# Patient Record
Sex: FEMALE | Race: BLACK OR AFRICAN AMERICAN | NOT HISPANIC OR LATINO | URBAN - METROPOLITAN AREA
[De-identification: names, ages, dates, MRNs, and addresses within clinical notes are randomized per-mention and may not be internally consistent; named-entity substitution may affect disease eponyms.]

---

## 2021-05-20 ENCOUNTER — OFFICE VISIT (OUTPATIENT)
Dept: URBAN - METROPOLITAN AREA CLINIC 118 | Facility: CLINIC | Age: 26
End: 2021-05-20

## 2021-05-20 ENCOUNTER — LAB OUTSIDE AN ENCOUNTER (OUTPATIENT)
Dept: URBAN - METROPOLITAN AREA CLINIC 118 | Facility: CLINIC | Age: 26
End: 2021-05-20

## 2021-05-20 DIAGNOSIS — K21.9 GERD WITHOUT ESOPHAGITIS: ICD-10-CM

## 2021-05-20 DIAGNOSIS — E66.01 MORBID OBESITY: ICD-10-CM

## 2021-05-20 DIAGNOSIS — R12 HEARTBURN: ICD-10-CM

## 2021-05-20 PROBLEM — 238136002: Status: ACTIVE | Noted: 2021-05-20

## 2021-05-20 PROBLEM — 266435005: Status: ACTIVE | Noted: 2021-05-20

## 2021-05-20 PROCEDURE — 99204 OFFICE O/P NEW MOD 45 MIN: CPT | Performed by: INTERNAL MEDICINE

## 2021-05-20 RX ORDER — PANTOPRAZOLE SODIUM 40 MG/1
1 TABLET TABLET, DELAYED RELEASE ORAL ONCE A DAY
Qty: 30 TABLET | Refills: 1 | OUTPATIENT
Start: 2021-05-20

## 2021-05-20 NOTE — HPI-TODAY'S VISIT:
This is a 26 yo female with pmh of asthma, CHRISTINE on CPAP, and obesity here for evaluation prior to her bariatric surgery. She is undergoing work up with [facility] bariatric clinic for planned gastric bypass. EGD was planned but not covered with the practice. She reports on and off heartburn symptoms with burning in her chest after eating and regurgitation, worse after eating at night. She states she had digestion problems as a child and needed a tube down her throat for feeding for some time but does not know of any diagnosis or other details.  Denies any dysphagia, nausea/vomiting.

## 2021-06-03 ENCOUNTER — OFFICE VISIT (OUTPATIENT)
Dept: URBAN - METROPOLITAN AREA MEDICAL CENTER 16 | Facility: MEDICAL CENTER | Age: 26
End: 2021-06-03

## 2021-06-03 DIAGNOSIS — K21.00 ALKALINE REFLUX ESOPHAGITIS: ICD-10-CM

## 2021-06-03 DIAGNOSIS — K31.89 ACQUIRED DEFORMITY OF DUODENUM: ICD-10-CM

## 2021-06-03 PROCEDURE — 43239 EGD BIOPSY SINGLE/MULTIPLE: CPT | Performed by: INTERNAL MEDICINE

## 2021-09-14 ENCOUNTER — TELEPHONE ENCOUNTER (OUTPATIENT)
Dept: URBAN - METROPOLITAN AREA CLINIC 23 | Facility: CLINIC | Age: 26
End: 2021-09-14

## 2021-10-20 ENCOUNTER — TELEPHONE ENCOUNTER (OUTPATIENT)
Dept: URBAN - METROPOLITAN AREA CLINIC 23 | Facility: CLINIC | Age: 26
End: 2021-10-20

## 2021-12-04 ENCOUNTER — OUT OF OFFICE VISIT (OUTPATIENT)
Dept: URBAN - METROPOLITAN AREA MEDICAL CENTER 10 | Facility: MEDICAL CENTER | Age: 26
End: 2021-12-04

## 2021-12-04 DIAGNOSIS — K31.89 ACQUIRED DEFORMITY OF DUODENUM: ICD-10-CM

## 2021-12-04 DIAGNOSIS — Z98.84 BARIATRIC SURG STAT-UNSP: ICD-10-CM

## 2021-12-04 DIAGNOSIS — K22.89 ESOPHAGEAL BLEEDING: ICD-10-CM

## 2021-12-04 DIAGNOSIS — R10.13 ABDOMINAL DISCOMFORT, EPIGASTRIC: ICD-10-CM

## 2021-12-04 DIAGNOSIS — R11.2 ACUTE NAUSEA WITH NONBILIOUS VOMITING: ICD-10-CM

## 2021-12-04 PROCEDURE — 43239 EGD BIOPSY SINGLE/MULTIPLE: CPT | Performed by: INTERNAL MEDICINE

## 2021-12-04 PROCEDURE — G8427 DOCREV CUR MEDS BY ELIG CLIN: HCPCS | Performed by: INTERNAL MEDICINE

## 2021-12-04 PROCEDURE — 99221 1ST HOSP IP/OBS SF/LOW 40: CPT | Performed by: INTERNAL MEDICINE

## 2023-11-01 ENCOUNTER — OFFICE VISIT (OUTPATIENT)
Dept: URBAN - METROPOLITAN AREA TELEHEALTH 2 | Facility: TELEHEALTH | Age: 28
End: 2023-11-01

## 2023-11-22 ENCOUNTER — DASHBOARD ENCOUNTERS (OUTPATIENT)
Age: 28
End: 2023-11-22

## 2023-11-27 ENCOUNTER — CLAIMS CREATED FROM THE CLAIM WINDOW (OUTPATIENT)
Dept: URBAN - METROPOLITAN AREA TELEHEALTH 2 | Facility: TELEHEALTH | Age: 28
End: 2023-11-27

## 2023-11-27 ENCOUNTER — OFFICE VISIT (OUTPATIENT)
Dept: URBAN - METROPOLITAN AREA TELEHEALTH 2 | Facility: TELEHEALTH | Age: 28
End: 2023-11-27

## 2023-11-27 VITALS — WEIGHT: 219 LBS | BODY MASS INDEX: 38.8 KG/M2 | HEIGHT: 63 IN

## 2023-11-27 DIAGNOSIS — R10.13 EPIGASTRIC ABDOMINAL PAIN: ICD-10-CM

## 2023-11-27 DIAGNOSIS — E66.01 MORBID OBESITY: ICD-10-CM

## 2023-11-27 DIAGNOSIS — R12 HEARTBURN: ICD-10-CM

## 2023-11-27 DIAGNOSIS — K21.9 GERD WITHOUT ESOPHAGITIS: ICD-10-CM

## 2023-11-27 DIAGNOSIS — K29.90 GASTRITIS AND DUODENITIS: ICD-10-CM

## 2023-11-27 DIAGNOSIS — Z98.84 HISTORY OF BARIATRIC SURGERY: ICD-10-CM

## 2023-11-27 PROCEDURE — 99442 PHONE E/M BY PHYS 11-20 MIN: CPT | Performed by: PHYSICIAN ASSISTANT

## 2023-11-27 RX ORDER — PANTOPRAZOLE SODIUM 40 MG/1
1 TABLET TABLET, DELAYED RELEASE ORAL TWICE A DAY
Qty: 60 TABLET | Refills: 1 | OUTPATIENT

## 2023-11-27 RX ORDER — MULTIVIT-MIN/IRON/FOLIC ACID/K 45-800-120
AS DIRECTED CAPSULE ORAL
Status: ACTIVE | COMMUNITY

## 2023-11-27 RX ORDER — OMEPRAZOLE 40 MG/1
1 CAPSULE 30 MINUTES BEFORE MORNING MEAL CAPSULE, DELAYED RELEASE ORAL ONCE A DAY
Status: ACTIVE | COMMUNITY

## 2023-11-27 RX ORDER — SUCRALFATE 1 G/1
1 TABLET ON AN EMPTY STOMACH TABLET ORAL TWICE A DAY
Qty: 60 TABLET | Refills: 1 | OUTPATIENT
Start: 2023-11-27 | End: 2024-01-26

## 2023-11-27 RX ORDER — PANTOPRAZOLE SODIUM 40 MG/1
1 TABLET TABLET, DELAYED RELEASE ORAL ONCE A DAY
Qty: 30 TABLET | Refills: 1 | Status: DISCONTINUED | COMMUNITY
Start: 2021-05-20

## 2023-11-27 NOTE — HPI-TODAY'S VISIT:
Ms. Pablo is a 27 year old Black female with history of obesity, asthma, CHRISTINE on CPAP. She was last seen 5/20/21 for evaluation prior to her bariatric surgery. She reports on and off heartburn symptoms with burning in her chest after eating and regurgitation, worse after eating at night. She states she had digestion problems as a child and needed a tube down her throat for feeding for some time but does not know of any diagnosis or other details. Denies any dysphagia, nausea/vomiting. She had an EGD by Dr. Day on December 4, 2021 with evidence of gastric bypass and a gastric pouch 5 cm length of the GE junction anastomosis seen.  This appeared to be characterized by congestion erythema and erosions with a hemorrhagic appearance and staple line intact.  This was also accompanied by erythema and nodularity of the cardia and the likely site of hiatal hernia repair with a small diverticulum.  Biopsies obtained.  Gastric pouch biopsies with no significant abnormality.  No intestinal metaplasia.  No H. pylori.  The patient had had a prior EGD Milla 3, 2021 by outside provider with findings of duodenitis, minimal hiatal hernia and normal stomach.  Biopsies from the small bowel unremarkable.  Reactive gastropathy without H. pylori in the gastric antrum and body.  Esophageal biopsies with reflux changes.  No intestinal metaplasia or dysplasia seen. She admits to having symptoms of gastritis without nausea, vomiting or dysphagia.  She is taking her supplements following bariatric surgery which include her iron daily and has had occasional dark stools, now more green or normal color.  No rectal bleeding reported.  She has both prescriptions for omeprazole and pantoprazole but believes pantoprazole works better for her.  Diet is fair and she is drinking a significant amount of water.

## 2023-11-27 NOTE — PHYSICAL EXAM CONSTITUTIONAL:
alert, , in no acute distress,  well developed, well nourished,  normal communication ability , alert